# Patient Record
Sex: MALE | Race: OTHER | HISPANIC OR LATINO | Employment: FULL TIME | URBAN - METROPOLITAN AREA
[De-identification: names, ages, dates, MRNs, and addresses within clinical notes are randomized per-mention and may not be internally consistent; named-entity substitution may affect disease eponyms.]

---

## 2024-02-19 ENCOUNTER — OFFICE VISIT (OUTPATIENT)
Dept: URGENT CARE | Facility: CLINIC | Age: 29
End: 2024-02-19
Payer: COMMERCIAL

## 2024-02-19 VITALS
HEIGHT: 70 IN | HEART RATE: 101 BPM | WEIGHT: 315 LBS | OXYGEN SATURATION: 96 % | RESPIRATION RATE: 18 BRPM | TEMPERATURE: 99 F | SYSTOLIC BLOOD PRESSURE: 138 MMHG | BODY MASS INDEX: 45.1 KG/M2 | DIASTOLIC BLOOD PRESSURE: 83 MMHG

## 2024-02-19 DIAGNOSIS — U07.1 COVID: ICD-10-CM

## 2024-02-19 DIAGNOSIS — R05.9 COUGH, UNSPECIFIED TYPE: Primary | ICD-10-CM

## 2024-02-19 DIAGNOSIS — J31.0 RHINITIS, UNSPECIFIED TYPE: ICD-10-CM

## 2024-02-19 DIAGNOSIS — U07.1 COVID-19 VIRUS DETECTED: ICD-10-CM

## 2024-02-19 LAB
CTP QC/QA: YES
CTP QC/QA: YES
POC MOLECULAR INFLUENZA A AGN: NEGATIVE
POC MOLECULAR INFLUENZA B AGN: NEGATIVE
SARS-COV-2 AG RESP QL IA.RAPID: POSITIVE

## 2024-02-19 PROCEDURE — 99203 OFFICE O/P NEW LOW 30 MIN: CPT | Mod: S$GLB,,, | Performed by: FAMILY MEDICINE

## 2024-02-19 PROCEDURE — 87502 INFLUENZA DNA AMP PROBE: CPT | Mod: QW,S$GLB,, | Performed by: FAMILY MEDICINE

## 2024-02-19 PROCEDURE — 87811 SARS-COV-2 COVID19 W/OPTIC: CPT | Mod: QW,S$GLB,, | Performed by: FAMILY MEDICINE

## 2024-02-19 RX ORDER — NIRMATRELVIR AND RITONAVIR 300-100 MG
KIT ORAL
Qty: 30 TABLET | Refills: 0 | Status: SHIPPED | OUTPATIENT
Start: 2024-02-19 | End: 2024-02-24

## 2024-02-19 RX ORDER — BENZONATATE 200 MG/1
200 CAPSULE ORAL 3 TIMES DAILY PRN
Qty: 30 CAPSULE | Refills: 0 | Status: SHIPPED | OUTPATIENT
Start: 2024-02-19 | End: 2024-02-29

## 2024-02-19 RX ORDER — IPRATROPIUM BROMIDE 21 UG/1
2 SPRAY, METERED NASAL 2 TIMES DAILY PRN
Qty: 30 ML | Refills: 0 | Status: SHIPPED | OUTPATIENT
Start: 2024-02-19

## 2024-02-20 NOTE — PROGRESS NOTES
Subjective:      Patient ID: Keo Bobo is a 28 y.o. male.    Vitals:  vitals were not taken for this visit.     Chief Complaint: No chief complaint on file.    Pt coming into clinic with sore throat, chest congestion, cough, possible fever, chills, sweats, body aches, weakness, dizziness, nasal congestion, sx started last night, treatment includes cold and flu with mild relief.    Cough  This is a new problem. The current episode started in the past 7 days. The problem has been unchanged. The problem occurs constantly. The cough is Productive of sputum. Associated symptoms include chills, a fever, headaches, nasal congestion, postnasal drip, a sore throat, sweats and wheezing. Pertinent negatives include no chest pain, ear congestion, ear pain or shortness of breath. Nothing aggravates the symptoms. He has tried OTC cough suppressant for the symptoms. The treatment provided mild relief. There is no history of asthma, bronchiectasis, bronchitis, COPD, emphysema, environmental allergies or pneumonia.     Constitution: Positive for chills and fever.   HENT:  Positive for postnasal drip and sore throat. Negative for ear pain.    Cardiovascular:  Negative for chest pain.   Respiratory:  Positive for cough and wheezing. Negative for shortness of breath.    Allergic/Immunologic: Negative for environmental allergies.   Neurological:  Positive for headaches.      Objective:     Physical Exam   Constitutional: He is oriented to person, place, and time. He appears ill. No distress. obesity  HENT:   Head: Normocephalic and atraumatic.   Ears:   Right Ear: Tympanic membrane, external ear and ear canal normal.   Left Ear: Tympanic membrane, external ear and ear canal normal.   Nose: Rhinorrhea and congestion present.   Mouth/Throat: Mucous membranes are moist. Oropharynx is clear.   Eyes: Conjunctivae are normal. Pupils are equal, round, and reactive to light. Extraocular movement intact   Neck: Neck supple. No neck  rigidity present.   Cardiovascular: Normal rate, regular rhythm and normal pulses.   No murmur heard.  Pulmonary/Chest: Effort normal. No stridor. No respiratory distress. He has wheezes. He has no rales.   Abdominal: Normal appearance and bowel sounds are normal. Soft. flat abdomen   Musculoskeletal: Normal range of motion.         General: Normal range of motion.   Neurological: no focal deficit. He is alert, oriented to person, place, and time and at baseline.   Skin: Skin is warm and dry. Capillary refill takes less than 2 seconds. jaundice  Psychiatric: His behavior is normal. Mood, judgment and thought content normal.   Nursing note and vitals reviewed.      Assessment:     Plan:     1. Cough, unspecified type  - POCT Influenza A/B MOLECULAR  - SARS Coronavirus 2 Antigen, POCT Manual Read  - benzonatate (TESSALON) 200 MG capsule; Take 1 capsule (200 mg total) by mouth 3 (three) times daily as needed.  Dispense: 30 capsule; Refill: 0    2. COVID  - nirmatrelvir-ritonavir (PAXLOVID) 300 mg (150 mg x 2)-100 mg copackaged tablets (EUA); Take 3 tablets by mouth 2 (two) times daily. Each dose contains 2 nirmatrelvir (pink tablets) and 1 ritonavir (white tablet). Take all 3 tablets together  Dispense: 30 tablet; Refill: 0    3. Rhinitis, unspecified type  - ipratropium (ATROVENT) 21 mcg (0.03 %) nasal spray; 2 sprays by Each Nostril route 2 (two) times daily as needed.  Dispense: 30 mL; Refill: 0   All results discussed with pt prior to discharge from clinic

## 2025-02-06 ENCOUNTER — OFFICE VISIT (OUTPATIENT)
Dept: URGENT CARE | Facility: CLINIC | Age: 30
End: 2025-02-06
Payer: COMMERCIAL

## 2025-02-06 VITALS
BODY MASS INDEX: 45.1 KG/M2 | OXYGEN SATURATION: 98 % | HEART RATE: 80 BPM | TEMPERATURE: 97 F | SYSTOLIC BLOOD PRESSURE: 112 MMHG | DIASTOLIC BLOOD PRESSURE: 70 MMHG | HEIGHT: 70 IN | RESPIRATION RATE: 20 BRPM | WEIGHT: 315 LBS

## 2025-02-06 DIAGNOSIS — R53.83 OTHER FATIGUE: Primary | ICD-10-CM

## 2025-02-06 DIAGNOSIS — R11.0 NAUSEA: ICD-10-CM

## 2025-02-06 DIAGNOSIS — G43.809 OTHER MIGRAINE WITHOUT STATUS MIGRAINOSUS, NOT INTRACTABLE: ICD-10-CM

## 2025-02-06 PROBLEM — K21.9 GASTROESOPHAGEAL REFLUX DISEASE WITHOUT ESOPHAGITIS: Status: ACTIVE | Noted: 2021-07-06

## 2025-02-06 PROBLEM — F41.9 ANXIETY: Status: ACTIVE | Noted: 2021-03-04

## 2025-02-06 PROBLEM — E66.9 OBESITY: Status: ACTIVE | Noted: 2021-07-06

## 2025-02-06 PROBLEM — R00.0 TACHYARRHYTHMIA: Status: ACTIVE | Noted: 2021-03-04

## 2025-02-06 PROBLEM — R40.0 DAYTIME SOMNOLENCE: Status: ACTIVE | Noted: 2022-06-20

## 2025-02-06 LAB
BILIRUBIN, UA POC OHS: NEGATIVE
BLOOD, UA POC OHS: NEGATIVE
CLARITY, UA POC OHS: CLEAR
COLOR, UA POC OHS: YELLOW
GLUCOSE, UA POC OHS: NEGATIVE
KETONES, UA POC OHS: NEGATIVE
LEUKOCYTES, UA POC OHS: NEGATIVE
NITRITE, UA POC OHS: NEGATIVE
PH, UA POC OHS: 5.5
PROTEIN, UA POC OHS: NEGATIVE
SPECIFIC GRAVITY, UA POC OHS: 1.02
UROBILINOGEN, UA POC OHS: 0.2

## 2025-02-06 PROCEDURE — 96372 THER/PROPH/DIAG INJ SC/IM: CPT | Mod: S$GLB,,, | Performed by: FAMILY MEDICINE

## 2025-02-06 PROCEDURE — 99213 OFFICE O/P EST LOW 20 MIN: CPT | Mod: 25,S$GLB,, | Performed by: PHYSICIAN ASSISTANT

## 2025-02-06 PROCEDURE — 81003 URINALYSIS AUTO W/O SCOPE: CPT | Mod: QW,S$GLB,, | Performed by: PHYSICIAN ASSISTANT

## 2025-02-06 RX ORDER — KETOROLAC TROMETHAMINE 30 MG/ML
30 INJECTION, SOLUTION INTRAMUSCULAR; INTRAVENOUS
Status: COMPLETED | OUTPATIENT
Start: 2025-02-06 | End: 2025-02-06

## 2025-02-06 RX ORDER — ONDANSETRON 4 MG/1
4 TABLET, ORALLY DISINTEGRATING ORAL EVERY 6 HOURS PRN
Qty: 30 TABLET | Refills: 0 | Status: SHIPPED | OUTPATIENT
Start: 2025-02-06 | End: 2025-02-16

## 2025-02-06 RX ORDER — BUTALBITAL, ACETAMINOPHEN AND CAFFEINE 50; 325; 40 MG/1; MG/1; MG/1
1 TABLET ORAL EVERY 4 HOURS PRN
Qty: 30 TABLET | Refills: 0 | Status: SHIPPED | OUTPATIENT
Start: 2025-02-06 | End: 2025-02-16

## 2025-02-06 RX ADMIN — KETOROLAC TROMETHAMINE 30 MG: 30 INJECTION, SOLUTION INTRAMUSCULAR; INTRAVENOUS at 05:02

## 2025-02-06 NOTE — LETTER
"  February 6, 2025      [facility] [address]  [place] [address]  Phone: [phone]  Fax: [phone]       Patient: Keo Bobo   YOB: 1995  Date of Visit: 02/06/2025    To Whom It May Concern:    Tod Bobo  was at [facility] on 02/06/2025. The patient may return to work/school on 2/7/25 with no restrictions. If you have any questions or concerns, or if I can be of further assistance, please do not hesitate to contact me.    Sincerely,        Vivichandrakant Delgado PA-C (Jackie)       "

## 2025-02-06 NOTE — PATIENT INSTRUCTIONS
Discussed with patient that if he/she is in pain today, only take tylenol due to toradol injection received in clinic. You can continue NSAIDS tomorrow such as ibuprofen (Motrin/Advil), naproxen (Aleve), Mobic (Meloxicam).   Keep a diary about your headaches. Write down when your headache happens. Write down what you were doing before it happened. Write down any foods or drinks that you had in the last day. This will help you learn what might be causing your headaches. Then, you can learn how to avoid them.  Place an ice pack or a bag of frozen peas wrapped in a towel over your head. Never put ice right on the skin. Do not leave the ice on more than 10 to 15 minutes at a time.  Using heat may also help. Try using a heating pad on the back of your neck. A warm shower or bath may also relax tight muscles.  Avoid bright lights and noise. Rest in a quiet, dark room. Sleep may also help.  Drink a caffeinated beverage. Be careful to not drink too much caffeine as this can cause other headaches.  Take Zofran 4 mg po every 6 to 8 hours prn nausea/vomiting.   Drink plenty of fluids.   Please follow-up with neurology. There are medications you can take to prevent HA and abort HA (Nurtec, Rizatriptan,Sumatriptin, Fiorcet, ibuprofen). If you have severe migraines, there are injections available (Botox, Ajovy, Emgality)    If not allergic, take Tylenol (Acetaminophen) 650 mg to  1 g every 6 hours as needed for fever/pain and/or Motrin (Ibuprofen) 600 to 800 mg every 6 hours as needed for pain and/fever  If you were not prescribed an anti-inflammatory medication, and if you do not have any history of stomach/intestinal ulcers, or kidney disease, or are not taking a blood thinner such as Coumadin, Plavix, Pradaxa, Eloquis, or Xaralta for example, it is OK to take over the counter Ibuprofen or Advil or Motrin or Aleve as directed.  Do not take these medications on an empty stomach.      If your blood pressure was elevated during  "your visit and this was discussed with you, please obtain a home blood pressure cuff and take your blood pressure once daily for two weeks, record values and follow up with your PCP. If you were started on blood pressure medication today, ensure you obtain a follow up appointment with your PCP in 5-7 days for a re-check, if you develop shortness of breath, severe headache, weakness, chest pain, vision problems after leaving urgent care, call 911 and go to the ER immediately.   Vitals:    02/06/25 1607 02/06/25 1713   BP: 122/78  Comment: Right arm 112/70   Pulse: 80    Resp: 20    Temp: 97 °F (36.1 °C)    TempSrc: Oral    SpO2: 98%    Weight: (!) 186 kg (410 lb)    Height: 5' 10" (1.778 m)          Please remember that you have received care at an urgent care today. Urgent cares are not emergency rooms and are not equipped to handle life threatening emergencies and cannot rule in or out certain medical conditions and you may be released before all of your medical problems are known or treated. Please arrange follow up with your primary care physician or speciality clinic  within 2-5 days if your signs and symptoms have not resolved or worsen. Patient can call our Referral Hotline at [phone] to make an appointment.    Please return here or go to the Emergency Department for any concerns or worsening of condition.Patient was educated on signs/symptoms that would warrant emergent medical attention. Patient verbalized understanding.  Signs of a bad reaction. These include very bad headache beyond the usual; speech, vision, motion problems or loss of balance; headaches are worse when lying down or headaches suddenly starts. Call for emergency help right away.  Changes in migraine attacks  Migraines that happen more often than 3 times a month  You are not feeling better in 2 to 3 days or you are feeling worse    "

## 2025-02-06 NOTE — PROGRESS NOTES
"Subjective:      Patient ID: Keo Bobo is a 29 y.o. male.    Vitals:  height is 5' 10" (1.778 m) and weight is 186 kg (410 lb) (abnormal). His oral temperature is 97 °F (36.1 °C). His blood pressure is 112/70 and his pulse is 80. His respiration is 20 and oxygen saturation is 98%.     Chief Complaint: Headache    Keo Bobo is a 29 y.o. male with ANAIS who complains of 1 week history of headaches to posterior scalp and some neck tension, he has been stressed at work and the work load, he has a pcp appointment in March.  He was at work when he used the pharmacy's automatic blood pressure, and was elevated at 156/98 at 9 AM, he denies any hx of HTN,  He felt a little dizzy most of the day during headaches, denies c/p  or sob.Patient does not drink coffee.      He reports a history of migraines; has not had a migraine in over 1 year. He reports ocular auras with migraines in the past. Denies ocular auras. Pt states he does not like taking advil due to history of NSAID-induced abdominal pain taken for back pain.He did not take advil with food.     Headache   This is a new problem. The current episode started in the past 7 days. The problem occurs constantly. The problem has been gradually worsening. The pain is located in the Occipital region. The pain does not radiate. The pain quality is not similar to prior headaches. The quality of the pain is described as aching and squeezing (tension). The pain is at a severity of 7/10. Associated symptoms include dizziness, a loss of balance, nausea and neck pain. Pertinent negatives include no abdominal pain, back pain, blurred vision, coughing, eye pain, eye redness, eye watering, fever, muscle aches, numbness, phonophobia, photophobia, rhinorrhea, seizures, sinus pressure, sore throat, tinnitus, visual change, vomiting, weakness or weight loss. Nothing aggravates the symptoms. He has tried nothing for the symptoms. The treatment provided no relief. His " past medical history is significant for migraine headaches and obesity. There is no history of cluster headaches, hypertension, migraines in the family, recent head traumas, sinus disease or TMJ.       Constitution: Negative for appetite change, chills, fatigue, fever and generalized weakness.   HENT:  Negative for tinnitus, congestion, postnasal drip, sinus pain, sinus pressure, sore throat, trouble swallowing and voice change.    Neck: Positive for neck pain. Negative for neck stiffness.   Cardiovascular:  Negative for chest trauma, chest pain, leg swelling, palpitations, sob on exertion and passing out.   Eyes:  Negative for eye pain, eye redness, photophobia and blurred vision.   Respiratory:  Positive for sleep apnea. Negative for cough, sputum production, shortness of breath, stridor, wheezing and asthma.    Gastrointestinal:  Positive for nausea. Negative for abdominal pain and vomiting.   Musculoskeletal:  Positive for pain. Negative for trauma, joint pain, joint swelling, abnormal ROM of joint, arthritis, gout, back pain, pain with walking, muscle cramps, muscle ache and history of spine disorder.   Allergic/Immunologic: Negative for asthma.   Neurological:  Positive for dizziness, light-headedness, loss of balance, headaches and history of migraines. Negative for facial drooping, speech difficulty, coordination disturbances, disorientation, altered mental status, loss of consciousness, numbness, tingling, seizures and tremors.   Psychiatric/Behavioral:  Negative for altered mental status and disorientation.       Objective:     Physical Exam   Constitutional: He is oriented to person, place, and time. He is cooperative. No distress.      Comments:Patient is awake and alert, sitting up in exam chair, speaking and answering in complete sentences     morbidly obeseawake  HENT:   Head: Normocephalic and atraumatic.   Ears:   Right Ear: Tympanic membrane, external ear and ear canal normal.   Left Ear: Tympanic  "membrane, external ear and ear canal normal.   Nose: Nose normal.   Mouth/Throat: Mucous membranes are moist. No oropharyngeal exudate or posterior oropharyngeal erythema. Oropharynx is clear.   Eyes: Conjunctivae are normal. Pupils are equal, round, and reactive to light. Extraocular movement intact   Neck: Neck supple. No crepitus. No neck rigidity present. No decreased range of motion present. pain with movement present. muscular tenderness present.   Cardiovascular: Normal rate, regular rhythm, normal heart sounds and normal pulses.   Pulmonary/Chest: Effort normal and breath sounds normal. No respiratory distress. He has no wheezes. He has no rhonchi. He has no rales.   Abdominal: Normal appearance.   Musculoskeletal: Normal range of motion.         General: Normal range of motion.      Cervical back: He exhibits no tenderness.   Lymphadenopathy:     He has no cervical adenopathy.   Neurological: He is alert and oriented to person, place, and time.   Skin: Skin is warm.   Psychiatric: His behavior is normal. Mood, judgment and thought content normal.   Nursing note and vitals reviewed.    Vitals:    02/06/25 1607 02/06/25 1713   BP: 122/78  Comment: Right arm 112/70   Pulse: 80    Resp: 20    Temp: 97 °F (36.1 °C)    TempSrc: Oral    SpO2: 98%    Weight: (!) 186 kg (410 lb)    Height: 5' 10" (1.778 m)        Assessment:     1. Other fatigue    2. Other migraine without status migrainosus, not intractable    3. Nausea      Patient presents with clinical exam findings and history consistent with above.      On exam, patient is nontoxic appearing and vitals are stable.        Diagnostic testing results were reviewed and discussed with patient/guardian.   Tests ordered in clinic:  Results for orders placed or performed in visit on 02/06/25   POCT Urinalysis(Instrument)    Collection Time: 02/06/25  4:18 PM   Result Value Ref Range    Color, POC UA Yellow Yellow, Straw, Colorless    Clarity, POC UA Clear Clear    " Glucose, POC UA Negative Negative    Bilirubin, POC UA Negative Negative    Ketones, POC UA Negative Negative    Spec Grav POC UA 1.025 1.005 - 1.030    Blood, POC UA Negative Negative    pH, POC UA 5.5 5.0 - 8.0    Protein, POC UA Negative Negative    Urobilinogen, POC UA 0.2 <=1.0    Nitrite, POC UA Negative Negative    WBC, POC UA Negative Negative       Previous progress notes/admissions/labs and medications were reviewed.    Plan:   Push oral fluids.    Other fatigue  -     POCT Urinalysis(Instrument)  -     Ambulatory referral/consult to Internal Medicine    Other migraine without status migrainosus, not intractable  -     ketorolac injection 30 mg  -     butalbital-acetaminophen-caffeine -40 mg (FIORICET, ESGIC) -40 mg per tablet; Take 1 tablet by mouth every 4 (four) hours as needed for Pain or Headaches (Maximum of 6 tablets daily).  Dispense: 30 tablet; Refill: 0  -     Ambulatory referral/consult to Neurology    Nausea  -     ondansetron (ZOFRAN-ODT) 4 MG TbDL; Take 1 tablet (4 mg total) by mouth every 6 (six) hours as needed (nausea/vomiting).  Dispense: 30 tablet; Refill: 0                1) See orders for this visit as documented in the electronic medical record.  2) Symptomatic therapy suggested: use acetaminophen/ibuprofen every 6-8 hours prn pain or fever, push fluids.   3) Call or return to clinic prn if these symptoms worsen or fail to improve as anticipated.    Discussed results/diagnosis/plan with patient in clinic.  We had shared decision making for patient's treatment. Patient verbalized understanding and in agreement with current treatment plan.     Patient was instructed to return for re-evaluation with urgent care or PCP for continued outpatient workup and management if symptoms do not improve/worsening symptoms. Strict ED versus clinic precautions given in depth.    Discharge and follow-up instructions given verbally/printed with the patient who expressed understanding. The  "instructions and results are also available on [facility].              [facility] "Olga" SANDRA Delgado          Patient Instructions   Discussed with patient that if he/she is in pain today, only take tylenol due to toradol injection received in clinic. You can continue NSAIDS tomorrow such as ibuprofen (Motrin/Advil), naproxen (Aleve), Mobic (Meloxicam).   Keep a diary about your headaches. Write down when your headache happens. Write down what you were doing before it happened. Write down any foods or drinks that you had in the last day. This will help you learn what might be causing your headaches. Then, you can learn how to avoid them.  Place an ice pack or a bag of frozen peas wrapped in a towel over your head. Never put ice right on the skin. Do not leave the ice on more than 10 to 15 minutes at a time.  Using heat may also help. Try using a heating pad on the back of your neck. A warm shower or bath may also relax tight muscles.  Avoid bright lights and noise. Rest in a quiet, dark room. Sleep may also help.  Drink a caffeinated beverage. Be careful to not drink too much caffeine as this can cause other headaches.  Take Zofran 4 mg po every 6 to 8 hours prn nausea/vomiting.   Drink plenty of fluids.   Please follow-up with neurology. There are medications you can take to prevent HA and abort HA (Nurtec, Rizatriptan,Sumatriptin, Fiorcet, ibuprofen). If you have severe migraines, there are injections available (Botox, Ajovy, Emgality)    If not allergic, take Tylenol (Acetaminophen) 650 mg to  1 g every 6 hours as needed for fever/pain and/or Motrin (Ibuprofen) 600 to 800 mg every 6 hours as needed for pain and/fever  If you were not prescribed an anti-inflammatory medication, and if you do not have any history of stomach/intestinal ulcers, or kidney disease, or are not taking a blood thinner such as Coumadin, Plavix, Pradaxa, Eloquis, or Xaralta for example, it is OK to take over the counter Ibuprofen or Advil or " "Motrin or Aleve as directed.  Do not take these medications on an empty stomach.      If your blood pressure was elevated during your visit and this was discussed with you, please obtain a home blood pressure cuff and take your blood pressure once daily for two weeks, record values and follow up with your PCP. If you were started on blood pressure medication today, ensure you obtain a follow up appointment with your PCP in 5-7 days for a re-check, if you develop shortness of breath, severe headache, weakness, chest pain, vision problems after leaving urgent care, call 911 and go to the ER immediately.   Vitals:    02/06/25 1607 02/06/25 1713   BP: 122/78  Comment: Right arm 112/70   Pulse: 80    Resp: 20    Temp: 97 °F (36.1 °C)    TempSrc: Oral    SpO2: 98%    Weight: (!) 186 kg (410 lb)    Height: 5' 10" (1.778 m)          Please remember that you have received care at an urgent care today. Urgent cares are not emergency rooms and are not equipped to handle life threatening emergencies and cannot rule in or out certain medical conditions and you may be released before all of your medical problems are known or treated. Please arrange follow up with your primary care physician or speciality clinic  within 2-5 days if your signs and symptoms have not resolved or worsen. Patient can call our Referral Hotline at [phone] to make an appointment.    Please return here or go to the Emergency Department for any concerns or worsening of condition.Patient was educated on signs/symptoms that would warrant emergent medical attention. Patient verbalized understanding.  Signs of a bad reaction. These include very bad headache beyond the usual; speech, vision, motion problems or loss of balance; headaches are worse when lying down or headaches suddenly starts. Call for emergency help right away.  Changes in migraine attacks  Migraines that happen more often than 3 times a month  You are not feeling better in 2 to 3 days or " you are feeling worse